# Patient Record
Sex: MALE | Race: WHITE | NOT HISPANIC OR LATINO
[De-identification: names, ages, dates, MRNs, and addresses within clinical notes are randomized per-mention and may not be internally consistent; named-entity substitution may affect disease eponyms.]

---

## 2021-08-30 ENCOUNTER — TRANSCRIPTION ENCOUNTER (OUTPATIENT)
Age: 76
End: 2021-08-30

## 2022-10-18 ENCOUNTER — NON-APPOINTMENT (OUTPATIENT)
Age: 77
End: 2022-10-18

## 2022-10-18 DIAGNOSIS — Z86.39 PERSONAL HISTORY OF OTHER ENDOCRINE, NUTRITIONAL AND METABOLIC DISEASE: ICD-10-CM

## 2022-10-18 DIAGNOSIS — Z87.891 PERSONAL HISTORY OF NICOTINE DEPENDENCE: ICD-10-CM

## 2022-10-18 DIAGNOSIS — E87.0 HYPEROSMOLALITY AND HYPERNATREMIA: ICD-10-CM

## 2022-10-18 DIAGNOSIS — Z82.3 FAMILY HISTORY OF STROKE: ICD-10-CM

## 2022-10-18 DIAGNOSIS — Z87.39 PERSONAL HISTORY OF OTHER DISEASES OF THE MUSCULOSKELETAL SYSTEM AND CONNECTIVE TISSUE: ICD-10-CM

## 2022-10-18 DIAGNOSIS — Z78.9 OTHER SPECIFIED HEALTH STATUS: ICD-10-CM

## 2022-10-18 DIAGNOSIS — Z85.820 PERSONAL HISTORY OF MALIGNANT MELANOMA OF SKIN: ICD-10-CM

## 2022-10-18 DIAGNOSIS — Z87.19 PERSONAL HISTORY OF OTHER DISEASES OF THE DIGESTIVE SYSTEM: ICD-10-CM

## 2022-10-18 DIAGNOSIS — Z80.42 FAMILY HISTORY OF MALIGNANT NEOPLASM OF PROSTATE: ICD-10-CM

## 2022-10-18 RX ORDER — ACETAMINOPHEN 500 MG
500 TABLET ORAL 4 TIMES DAILY
Refills: 0 | Status: ACTIVE | COMMUNITY

## 2022-12-09 ENCOUNTER — APPOINTMENT (OUTPATIENT)
Dept: CARDIOLOGY | Facility: CLINIC | Age: 77
End: 2022-12-09

## 2022-12-09 VITALS
DIASTOLIC BLOOD PRESSURE: 70 MMHG | TEMPERATURE: 97.7 F | HEIGHT: 68 IN | HEART RATE: 85 BPM | OXYGEN SATURATION: 95 % | BODY MASS INDEX: 33.01 KG/M2 | SYSTOLIC BLOOD PRESSURE: 146 MMHG | WEIGHT: 217.8 LBS

## 2022-12-09 DIAGNOSIS — Z23 ENCOUNTER FOR IMMUNIZATION: ICD-10-CM

## 2022-12-09 PROCEDURE — 99213 OFFICE O/P EST LOW 20 MIN: CPT

## 2022-12-09 PROCEDURE — G0008: CPT

## 2022-12-09 PROCEDURE — 90662 IIV NO PRSV INCREASED AG IM: CPT

## 2022-12-09 NOTE — REASON FOR VISIT
[Other: ____] : [unfilled] [FreeTextEntry1] : 77 years old male status post CVA of the prostate and peptic ulcer disease comes here for follow-up

## 2022-12-09 NOTE — DISCUSSION/SUMMARY
[FreeTextEntry1] : 77 years old male with status post septoplasty peptic ulcer disease comes here for follow-up.  Patient was given flu vaccine today

## 2023-02-09 ENCOUNTER — APPOINTMENT (OUTPATIENT)
Dept: CARDIOLOGY | Facility: CLINIC | Age: 78
End: 2023-02-09
Payer: MEDICARE

## 2023-02-09 VITALS
DIASTOLIC BLOOD PRESSURE: 62 MMHG | HEART RATE: 91 BPM | TEMPERATURE: 99 F | OXYGEN SATURATION: 96 % | SYSTOLIC BLOOD PRESSURE: 110 MMHG | BODY MASS INDEX: 32.28 KG/M2 | HEIGHT: 68 IN | WEIGHT: 213 LBS

## 2023-02-09 PROCEDURE — 36415 COLL VENOUS BLD VENIPUNCTURE: CPT

## 2023-02-09 PROCEDURE — 99213 OFFICE O/P EST LOW 20 MIN: CPT | Mod: 25

## 2023-02-09 RX ORDER — PANTOPRAZOLE SODIUM 40 MG/10ML
40 INJECTION, POWDER, FOR SOLUTION INTRAVENOUS
Qty: 90 | Refills: 0 | Status: DISCONTINUED | COMMUNITY
Start: 2023-02-09 | End: 2023-02-09

## 2023-02-09 NOTE — PHYSICAL EXAM
[No Acute Distress] : no acute distress [Well Nourished] : well nourished [Well Developed] : well developed [Well-Appearing] : well-appearing [Normal Sclera/Conjunctiva] : normal sclera/conjunctiva [PERRL] : pupils equal round and reactive to light [EOMI] : extraocular movements intact [Normal Outer Ear/Nose] : the outer ears and nose were normal in appearance [Normal Oropharynx] : the oropharynx was normal [No JVD] : no jugular venous distention [No Lymphadenopathy] : no lymphadenopathy [Supple] : supple [Thyroid Normal, No Nodules] : the thyroid was normal and there were no nodules present [No Respiratory Distress] : no respiratory distress  [No Accessory Muscle Use] : no accessory muscle use [Clear to Auscultation] : lungs were clear to auscultation bilaterally [Normal Rate] : normal rate  [Regular Rhythm] : with a regular rhythm [Normal S1, S2] : normal S1 and S2 [No Murmur] : no murmur heard [No Carotid Bruits] : no carotid bruits [No Abdominal Bruit] : a ~M bruit was not heard ~T in the abdomen [No Varicosities] : no varicosities [Pedal Pulses Present] : the pedal pulses are present [No Edema] : there was no peripheral edema [No Palpable Aorta] : no palpable aorta [No Extremity Clubbing/Cyanosis] : no extremity clubbing/cyanosis [Soft] : abdomen soft [Non Tender] : non-tender [Non-distended] : non-distended [No Masses] : no abdominal mass palpated [No HSM] : no HSM [Normal Bowel Sounds] : normal bowel sounds [Normal Posterior Cervical Nodes] : no posterior cervical lymphadenopathy [Normal Anterior Cervical Nodes] : no anterior cervical lymphadenopathy [No CVA Tenderness] : no CVA  tenderness [No Spinal Tenderness] : no spinal tenderness [No Joint Swelling] : no joint swelling [Grossly Normal Strength/Tone] : grossly normal strength/tone [No Rash] : no rash [Coordination Grossly Intact] : coordination grossly intact [No Focal Deficits] : no focal deficits [Normal Gait] : normal gait [Deep Tendon Reflexes (DTR)] : deep tendon reflexes were 2+ and symmetric [Normal Affect] : the affect was normal [Normal Insight/Judgement] : insight and judgment were intact [de-identified] : no masses

## 2023-02-09 NOTE — ASSESSMENT
[FreeTextEntry1] : Patient with epigastric right upper quadrant pain.  It is consistent with possible exacerbation of peptic ulcer disease–patient was given Protonix patient had a CBC drawn today patient was advised for mammogram of the left breast however patient does not want to get it done at this time

## 2023-02-09 NOTE — HISTORY OF PRESENT ILLNESS
[FreeTextEntry1] : Epigastric right upper quadrant pain [de-identified] : 77 years old male comes to the office with complaints of epigastric right upper quadrant pain.  Apparently patient is drinking little more than usual.  History of peptic ulcer disease in the past .t patient also complains of discomfort in the left breast.  Stool was black before now it is normal color

## 2023-02-10 ENCOUNTER — TRANSCRIPTION ENCOUNTER (OUTPATIENT)
Age: 78
End: 2023-02-10

## 2023-02-10 LAB
BASOPHILS # BLD AUTO: 0.05 K/UL
BASOPHILS NFR BLD AUTO: 0.8 %
EOSINOPHIL # BLD AUTO: 0.87 K/UL
EOSINOPHIL NFR BLD AUTO: 14.6 %
HCT VFR BLD CALC: 47.3 %
HGB BLD-MCNC: 16.4 G/DL
IMM GRANULOCYTES NFR BLD AUTO: 0.7 %
LYMPHOCYTES # BLD AUTO: 0.86 K/UL
LYMPHOCYTES NFR BLD AUTO: 14.5 %
MAN DIFF?: NORMAL
MCHC RBC-ENTMCNC: 31.6 PG
MCHC RBC-ENTMCNC: 34.7 GM/DL
MCV RBC AUTO: 91.1 FL
MONOCYTES # BLD AUTO: 0.42 K/UL
MONOCYTES NFR BLD AUTO: 7.1 %
NEUTROPHILS # BLD AUTO: 3.7 K/UL
NEUTROPHILS NFR BLD AUTO: 62.3 %
PLATELET # BLD AUTO: 191 K/UL
RBC # BLD: 5.19 M/UL
RBC # FLD: 12.7 %
WBC # FLD AUTO: 5.94 K/UL

## 2023-09-22 ENCOUNTER — APPOINTMENT (OUTPATIENT)
Dept: CARDIOLOGY | Facility: CLINIC | Age: 78
End: 2023-09-22
Payer: MEDICARE

## 2023-09-22 VITALS
WEIGHT: 221 LBS | OXYGEN SATURATION: 96 % | HEIGHT: 68 IN | HEART RATE: 91 BPM | BODY MASS INDEX: 33.49 KG/M2 | DIASTOLIC BLOOD PRESSURE: 86 MMHG | SYSTOLIC BLOOD PRESSURE: 130 MMHG

## 2023-09-22 DIAGNOSIS — Z00.00 ENCOUNTER FOR GENERAL ADULT MEDICAL EXAMINATION W/OUT ABNORMAL FINDINGS: ICD-10-CM

## 2023-09-22 DIAGNOSIS — C61 MALIGNANT NEOPLASM OF PROSTATE: ICD-10-CM

## 2023-09-22 DIAGNOSIS — K27.9 PEPTIC ULCER, SITE UNSPECIFIED, UNSPECIFIED AS ACUTE OR CHRONIC, W/OUT HEMORRHAGE OR PERFORATION: ICD-10-CM

## 2023-09-22 PROCEDURE — 99213 OFFICE O/P EST LOW 20 MIN: CPT

## 2023-09-27 LAB
ALBUMIN SERPL ELPH-MCNC: 4.8 G/DL
ALP BLD-CCNC: 59 U/L
ALT SERPL-CCNC: 29 U/L
ANION GAP SERPL CALC-SCNC: 11 MMOL/L
AST SERPL-CCNC: 32 U/L
BILIRUB SERPL-MCNC: 0.6 MG/DL
BUN SERPL-MCNC: 12 MG/DL
CALCIUM SERPL-MCNC: 9.3 MG/DL
CHLORIDE SERPL-SCNC: 96 MMOL/L
CHOLEST SERPL-MCNC: 209 MG/DL
CO2 SERPL-SCNC: 29 MMOL/L
CREAT SERPL-MCNC: 0.67 MG/DL
EGFR: 96 ML/MIN/1.73M2
GLUCOSE SERPL-MCNC: 108 MG/DL
HCT VFR BLD CALC: 44.5 %
HDLC SERPL-MCNC: 62 MG/DL
HGB BLD-MCNC: 14.5 G/DL
LDLC SERPL CALC-MCNC: 132 MG/DL
MCHC RBC-ENTMCNC: 31.5 PG
MCHC RBC-ENTMCNC: 32.6 GM/DL
MCV RBC AUTO: 96.5 FL
NONHDLC SERPL-MCNC: 147 MG/DL
PLATELET # BLD AUTO: 226 K/UL
POTASSIUM SERPL-SCNC: 4.6 MMOL/L
PROT SERPL-MCNC: 8 G/DL
PSA SERPL-MCNC: 0.13 NG/ML
RBC # BLD: 4.61 M/UL
RBC # FLD: 13.2 %
SODIUM SERPL-SCNC: 136 MMOL/L
T4 SERPL-MCNC: 4.9 UG/DL
TRIGL SERPL-MCNC: 86 MG/DL
TSH SERPL-ACNC: 2.54 UIU/ML
WBC # FLD AUTO: 5.86 K/UL

## 2024-02-29 ENCOUNTER — APPOINTMENT (OUTPATIENT)
Dept: CARDIOLOGY | Facility: CLINIC | Age: 79
End: 2024-02-29
Payer: MEDICARE

## 2024-02-29 ENCOUNTER — NON-APPOINTMENT (OUTPATIENT)
Age: 79
End: 2024-02-29

## 2024-02-29 VITALS
DIASTOLIC BLOOD PRESSURE: 85 MMHG | BODY MASS INDEX: 34.86 KG/M2 | OXYGEN SATURATION: 96 % | WEIGHT: 230 LBS | TEMPERATURE: 98 F | SYSTOLIC BLOOD PRESSURE: 153 MMHG | HEART RATE: 87 BPM | HEIGHT: 68 IN

## 2024-02-29 DIAGNOSIS — Z74.09 OTHER REDUCED MOBILITY: ICD-10-CM

## 2024-02-29 DIAGNOSIS — R00.2 PALPITATIONS: ICD-10-CM

## 2024-02-29 DIAGNOSIS — R06.02 SHORTNESS OF BREATH: ICD-10-CM

## 2024-02-29 PROCEDURE — 99213 OFFICE O/P EST LOW 20 MIN: CPT | Mod: 25

## 2024-02-29 PROCEDURE — 93000 ELECTROCARDIOGRAM COMPLETE: CPT

## 2024-02-29 RX ORDER — PANTOPRAZOLE 40 MG/1
40 TABLET, DELAYED RELEASE ORAL DAILY
Qty: 90 | Refills: 1 | Status: ACTIVE | COMMUNITY
Start: 1900-01-01 | End: 1900-01-01

## 2024-02-29 RX ORDER — PANTOPRAZOLE 40 MG/1
40 TABLET, DELAYED RELEASE ORAL DAILY
Qty: 90 | Refills: 1 | Status: DISCONTINUED | COMMUNITY
Start: 2023-02-09 | End: 2024-02-29

## 2024-02-29 NOTE — REASON FOR VISIT
[Other: ____] : [unfilled] [FreeTextEntry1] : 78 years old male with history of CA of the prostate, melanoma and history of peptic ulcer disease comes to the office with complaints of increasing shortness of breath at rest and mild exertion also patient with  precordial discomfort

## 2024-02-29 NOTE — ASSESSMENT
[FreeTextEntry1] : Patient also complains of limited mobility andrisk of fall.  Patient was advised to use cane.  Patient was advised for coronary CTA for further evaluation of his cardiac status

## 2024-03-14 ENCOUNTER — APPOINTMENT (OUTPATIENT)
Dept: CT IMAGING | Facility: CLINIC | Age: 79
End: 2024-03-14
Payer: MEDICARE

## 2024-03-14 PROCEDURE — 75574 CT ANGIO HRT W/3D IMAGE: CPT

## 2024-04-01 ENCOUNTER — APPOINTMENT (OUTPATIENT)
Dept: CARDIOLOGY | Facility: CLINIC | Age: 79
End: 2024-04-01
Payer: MEDICARE

## 2024-04-01 VITALS
DIASTOLIC BLOOD PRESSURE: 80 MMHG | BODY MASS INDEX: 34.82 KG/M2 | SYSTOLIC BLOOD PRESSURE: 138 MMHG | TEMPERATURE: 98.2 F | OXYGEN SATURATION: 97 % | WEIGHT: 229 LBS | HEART RATE: 69 BPM

## 2024-04-01 VITALS
DIASTOLIC BLOOD PRESSURE: 80 MMHG | HEART RATE: 69 BPM | OXYGEN SATURATION: 97 % | BODY MASS INDEX: 34.71 KG/M2 | SYSTOLIC BLOOD PRESSURE: 138 MMHG | TEMPERATURE: 98.2 F | WEIGHT: 229 LBS | HEIGHT: 68 IN

## 2024-04-01 DIAGNOSIS — M54.50 LOW BACK PAIN, UNSPECIFIED: ICD-10-CM

## 2024-04-01 DIAGNOSIS — E78.5 HYPERLIPIDEMIA, UNSPECIFIED: ICD-10-CM

## 2024-04-01 PROCEDURE — 99213 OFFICE O/P EST LOW 20 MIN: CPT

## 2024-04-01 RX ORDER — MELOXICAM 15 MG/1
15 TABLET ORAL
Qty: 10 | Refills: 0 | Status: ACTIVE | COMMUNITY
Start: 2024-04-01 | End: 1900-01-01

## 2024-04-01 NOTE — ASSESSMENT
[FreeTextEntry1] : Patient's blood work did reveal cholesterol 219 LDL of 132 and mild coronary artery disease on coronary CTA, patient was started on Lipitor 10 mg p.o. once a day.  Patient was also given a prescription for meloxicam 15 mg p.o. as needed for back pain patient was told about the side effects of the medication including but not limited to GI bleeding

## 2024-04-01 NOTE — PHYSICAL EXAM
[Well Nourished] : well nourished [Well Developed] : well developed [No Acute Distress] : no acute distress [Normal Conjunctiva] : normal conjunctiva [No Carotid Bruit] : no carotid bruit [Normal Venous Pressure] : normal venous pressure [Normal S1, S2] : normal S1, S2 [No Rub] : no rub [No Murmur] : no murmur [No Gallop] : no gallop [Good Air Entry] : good air entry [Clear Lung Fields] : clear lung fields [No Respiratory Distress] : no respiratory distress  [Soft] : abdomen soft [Non Tender] : non-tender [No Masses/organomegaly] : no masses/organomegaly [Normal Bowel Sounds] : normal bowel sounds [Normal Gait] : normal gait [No Cyanosis] : no cyanosis [No Edema] : no edema [No Clubbing] : no clubbing [No Varicosities] : no varicosities [No Skin Lesions] : no skin lesions [No Rash] : no rash [Moves all extremities] : moves all extremities [Normal Speech] : normal speech [No Focal Deficits] : no focal deficits [Normal memory] : normal memory [Alert and Oriented] : alert and oriented

## 2024-04-01 NOTE — REASON FOR VISIT
Alert & oriented; no sensory, motor or coordination deficits, normal reflexes [Hyperlipidemia] : hyperlipidemia [Other: ____] : [unfilled] [FreeTextEntry1] : 78 years old male with dyslipidemia mild and mild coronary artery disease on coronary CTA comes to the office for routine follow-up negative

## 2024-06-25 ENCOUNTER — RX RENEWAL (OUTPATIENT)
Age: 79
End: 2024-06-25

## 2024-06-25 RX ORDER — ATORVASTATIN CALCIUM 10 MG/1
10 TABLET, FILM COATED ORAL
Qty: 90 | Refills: 0 | Status: ACTIVE | COMMUNITY
Start: 2024-06-25 | End: 1900-01-01

## 2024-06-25 RX ORDER — ATORVASTATIN CALCIUM 10 MG/1
10 TABLET, FILM COATED ORAL
Qty: 90 | Refills: 0 | Status: ACTIVE | COMMUNITY
Start: 2024-04-01 | End: 1900-01-01

## 2024-07-19 ENCOUNTER — APPOINTMENT (OUTPATIENT)
Dept: CARDIOLOGY | Facility: CLINIC | Age: 79
End: 2024-07-19
Payer: MEDICARE

## 2024-07-19 VITALS
BODY MASS INDEX: 34.4 KG/M2 | WEIGHT: 227 LBS | HEART RATE: 84 BPM | TEMPERATURE: 97.8 F | DIASTOLIC BLOOD PRESSURE: 80 MMHG | OXYGEN SATURATION: 95 % | HEIGHT: 68 IN | SYSTOLIC BLOOD PRESSURE: 140 MMHG | RESPIRATION RATE: 17 BRPM

## 2024-07-19 DIAGNOSIS — R00.2 PALPITATIONS: ICD-10-CM

## 2024-07-19 DIAGNOSIS — E78.5 HYPERLIPIDEMIA, UNSPECIFIED: ICD-10-CM

## 2024-07-19 DIAGNOSIS — C61 MALIGNANT NEOPLASM OF PROSTATE: ICD-10-CM

## 2024-07-19 DIAGNOSIS — R06.02 SHORTNESS OF BREATH: ICD-10-CM

## 2024-07-19 DIAGNOSIS — I25.10 ATHEROSCLEROTIC HEART DISEASE OF NATIVE CORONARY ARTERY W/OUT ANGINA PECTORIS: ICD-10-CM

## 2024-07-19 PROCEDURE — 99213 OFFICE O/P EST LOW 20 MIN: CPT

## 2024-08-20 ENCOUNTER — APPOINTMENT (OUTPATIENT)
Dept: CARDIOLOGY | Facility: CLINIC | Age: 79
End: 2024-08-20
Payer: MEDICARE

## 2024-08-20 PROCEDURE — 93306 TTE W/DOPPLER COMPLETE: CPT

## 2024-09-05 ENCOUNTER — RX RENEWAL (OUTPATIENT)
Age: 79
End: 2024-09-05

## 2024-12-02 ENCOUNTER — RX RENEWAL (OUTPATIENT)
Age: 79
End: 2024-12-02

## 2024-12-17 ENCOUNTER — RX RENEWAL (OUTPATIENT)
Age: 79
End: 2024-12-17

## 2025-01-16 ENCOUNTER — APPOINTMENT (OUTPATIENT)
Dept: CARDIOLOGY | Facility: CLINIC | Age: 80
End: 2025-01-16
Payer: MEDICARE

## 2025-01-16 ENCOUNTER — NON-APPOINTMENT (OUTPATIENT)
Age: 80
End: 2025-01-16

## 2025-01-16 VITALS
DIASTOLIC BLOOD PRESSURE: 76 MMHG | SYSTOLIC BLOOD PRESSURE: 148 MMHG | WEIGHT: 227 LBS | HEIGHT: 60 IN | HEART RATE: 90 BPM | OXYGEN SATURATION: 95 % | BODY MASS INDEX: 44.57 KG/M2 | TEMPERATURE: 97 F

## 2025-01-16 DIAGNOSIS — C61 MALIGNANT NEOPLASM OF PROSTATE: ICD-10-CM

## 2025-01-16 DIAGNOSIS — E78.5 HYPERLIPIDEMIA, UNSPECIFIED: ICD-10-CM

## 2025-01-16 DIAGNOSIS — R06.02 SHORTNESS OF BREATH: ICD-10-CM

## 2025-01-16 PROCEDURE — 99213 OFFICE O/P EST LOW 20 MIN: CPT

## 2025-02-26 ENCOUNTER — RX RENEWAL (OUTPATIENT)
Age: 80
End: 2025-02-26

## 2025-02-28 ENCOUNTER — APPOINTMENT (OUTPATIENT)
Dept: CARDIOLOGY | Facility: CLINIC | Age: 80
End: 2025-02-28
Payer: MEDICARE

## 2025-02-28 ENCOUNTER — NON-APPOINTMENT (OUTPATIENT)
Age: 80
End: 2025-02-28

## 2025-02-28 VITALS
OXYGEN SATURATION: 98 % | HEART RATE: 76 BPM | DIASTOLIC BLOOD PRESSURE: 80 MMHG | BODY MASS INDEX: 44.96 KG/M2 | WEIGHT: 229 LBS | SYSTOLIC BLOOD PRESSURE: 123 MMHG | HEIGHT: 60 IN

## 2025-02-28 DIAGNOSIS — R00.2 PALPITATIONS: ICD-10-CM

## 2025-02-28 DIAGNOSIS — C61 MALIGNANT NEOPLASM OF PROSTATE: ICD-10-CM

## 2025-02-28 DIAGNOSIS — M85.80 OTHER SPECIFIED DISORDERS OF BONE DENSITY AND STRUCTURE, UNSPECIFIED SITE: ICD-10-CM

## 2025-02-28 DIAGNOSIS — K27.9 PEPTIC ULCER, SITE UNSPECIFIED, UNSPECIFIED AS ACUTE OR CHRONIC, W/OUT HEMORRHAGE OR PERFORATION: ICD-10-CM

## 2025-02-28 DIAGNOSIS — R21 RASH AND OTHER NONSPECIFIC SKIN ERUPTION: ICD-10-CM

## 2025-02-28 PROCEDURE — 36415 COLL VENOUS BLD VENIPUNCTURE: CPT

## 2025-02-28 PROCEDURE — 99397 PER PM REEVAL EST PAT 65+ YR: CPT

## 2025-02-28 PROCEDURE — 93000 ELECTROCARDIOGRAM COMPLETE: CPT

## 2025-03-04 RX ORDER — HYDROCORTISONE 0.5 G/100G
0.5 CREAM TOPICAL 3 TIMES DAILY
Qty: 1 | Refills: 0 | Status: ACTIVE | COMMUNITY
Start: 2025-02-28 | End: 1900-01-01

## 2025-03-05 LAB
24R-OH-CALCIDIOL SERPL-MCNC: 53.7 PG/ML
ALBUMIN SERPL ELPH-MCNC: 4.3 G/DL
ALP BLD-CCNC: 58 U/L
ALT SERPL-CCNC: 32 U/L
ANION GAP SERPL CALC-SCNC: 11 MMOL/L
AST SERPL-CCNC: 27 U/L
BILIRUB SERPL-MCNC: 0.6 MG/DL
BUN SERPL-MCNC: 11 MG/DL
CALCIUM SERPL-MCNC: 8.8 MG/DL
CHLORIDE SERPL-SCNC: 96 MMOL/L
CHOLEST SERPL-MCNC: 138 MG/DL
CO2 SERPL-SCNC: 25 MMOL/L
CREAT SERPL-MCNC: 0.64 MG/DL
EGFR: 96 ML/MIN/1.73M2
GLUCOSE SERPL-MCNC: 112 MG/DL
HCT VFR BLD CALC: 40.1 %
HDLC SERPL-MCNC: 53 MG/DL
HGB BLD-MCNC: 13.3 G/DL
LDLC SERPL CALC-MCNC: 70 MG/DL
MCHC RBC-ENTMCNC: 30.8 PG
MCHC RBC-ENTMCNC: 33.2 G/DL
MCV RBC AUTO: 92.8 FL
NONHDLC SERPL-MCNC: 85 MG/DL
PLATELET # BLD AUTO: 249 K/UL
POTASSIUM SERPL-SCNC: 4.4 MMOL/L
PROT SERPL-MCNC: 7.5 G/DL
PSA SERPL-MCNC: 0.1 NG/ML
RBC # BLD: 4.32 M/UL
RBC # FLD: 12.7 %
SODIUM SERPL-SCNC: 132 MMOL/L
T4 SERPL-MCNC: 5.5 UG/DL
TRIGL SERPL-MCNC: 73 MG/DL
TSH SERPL-ACNC: 3.12 UIU/ML
WBC # FLD AUTO: 5.97 K/UL

## 2025-05-22 ENCOUNTER — RX RENEWAL (OUTPATIENT)
Age: 80
End: 2025-05-22

## 2025-06-19 ENCOUNTER — RX RENEWAL (OUTPATIENT)
Age: 80
End: 2025-06-19